# Patient Record
Sex: FEMALE | Race: BLACK OR AFRICAN AMERICAN | NOT HISPANIC OR LATINO | Employment: UNEMPLOYED | URBAN - METROPOLITAN AREA
[De-identification: names, ages, dates, MRNs, and addresses within clinical notes are randomized per-mention and may not be internally consistent; named-entity substitution may affect disease eponyms.]

---

## 2023-03-10 ENCOUNTER — OFFICE VISIT (OUTPATIENT)
Dept: URGENT CARE | Facility: CLINIC | Age: 58
End: 2023-03-10
Payer: OTHER GOVERNMENT

## 2023-03-10 VITALS
OXYGEN SATURATION: 97 % | DIASTOLIC BLOOD PRESSURE: 85 MMHG | HEART RATE: 84 BPM | TEMPERATURE: 98 F | SYSTOLIC BLOOD PRESSURE: 146 MMHG | WEIGHT: 196 LBS | BODY MASS INDEX: 31.5 KG/M2 | RESPIRATION RATE: 16 BRPM | HEIGHT: 66 IN

## 2023-03-10 DIAGNOSIS — L30.9 VULVAR DERMATITIS: ICD-10-CM

## 2023-03-10 DIAGNOSIS — N76.0 ACUTE VAGINITIS: Primary | ICD-10-CM

## 2023-03-10 DIAGNOSIS — R30.0 DYSURIA: ICD-10-CM

## 2023-03-10 LAB
BILIRUB UR QL STRIP: NEGATIVE
GLUCOSE UR QL STRIP: NEGATIVE
KETONES UR QL STRIP: NEGATIVE
LEUKOCYTE ESTERASE UR QL STRIP: NEGATIVE
PH, POC UA: 5 (ref 5–8)
POC BLOOD, URINE: NEGATIVE
POC NITRATES, URINE: NEGATIVE
PROT UR QL STRIP: NEGATIVE
SP GR UR STRIP: 1.01 (ref 1–1.03)
UROBILINOGEN UR STRIP-ACNC: NORMAL (ref 0.1–1.1)

## 2023-03-10 PROCEDURE — 99204 OFFICE O/P NEW MOD 45 MIN: CPT | Mod: S$GLB,,,

## 2023-03-10 PROCEDURE — 81003 URINALYSIS AUTO W/O SCOPE: CPT | Mod: QW,S$GLB,,

## 2023-03-10 PROCEDURE — 99204 PR OFFICE/OUTPT VISIT, NEW, LEVL IV, 45-59 MIN: ICD-10-PCS | Mod: S$GLB,,,

## 2023-03-10 PROCEDURE — 81003 POCT URINALYSIS, DIPSTICK, AUTOMATED, W/O SCOPE: ICD-10-PCS | Mod: QW,S$GLB,,

## 2023-03-10 RX ORDER — NYSTATIN 100000 U/G
OINTMENT TOPICAL 2 TIMES DAILY
Qty: 15 G | Refills: 0 | Status: SHIPPED | OUTPATIENT
Start: 2023-03-10 | End: 2023-08-22

## 2023-03-10 RX ORDER — FLUCONAZOLE 150 MG/1
TABLET ORAL
Qty: 2 TABLET | Refills: 0 | Status: SHIPPED | OUTPATIENT
Start: 2023-03-10 | End: 2023-03-20

## 2023-03-10 RX ORDER — FLUCONAZOLE 150 MG/1
TABLET ORAL
Qty: 2 TABLET | Refills: 0 | Status: SHIPPED | OUTPATIENT
Start: 2023-03-10 | End: 2023-03-10

## 2023-03-10 RX ORDER — NYSTATIN 100000 U/G
OINTMENT TOPICAL 2 TIMES DAILY
Qty: 15 G | Refills: 0 | Status: SHIPPED | OUTPATIENT
Start: 2023-03-10 | End: 2023-03-10

## 2023-03-10 NOTE — PROGRESS NOTES
"Subjective:       Patient ID: Angy Davis is a 57 y.o. female.    Vitals:  height is 5' 6" (1.676 m) and weight is 88.9 kg (196 lb). Her oral temperature is 97.8 °F (36.6 °C). Her blood pressure is 146/85 (abnormal) and her pulse is 84. Her respiration is 16 and oxygen saturation is 97%.     Chief Complaint: Itching    Patient reports having vaginal itching and irritation on and off for a year. Pt reports history of stress incontinence and stated she has to use panty liners and [employer] diapers. Pt is wondering if the vaginal itching is from using a new brand of panty liners. Pt has bladder sling surgery scheduled at end of March. Hx of hysterectomy.     Itching  This is a recurrent problem. Episode onset: 1 year. The problem occurs constantly. The problem has been gradually worsening. Associated symptoms include urinary symptoms. Pertinent negatives include no congestion, coughing, fever, headaches, nausea or vomiting. Associated symptoms comments: Vaginal itching, making sores from itching. Treatments tried: vagisil, chafing cream, monostat. The treatment provided no relief.     Constitution: Negative for fever.   HENT:  Negative for congestion.    Respiratory:  Negative for cough.    Gastrointestinal:  Negative for nausea and vomiting.   Genitourinary:  Positive for dysuria and bladder incontinence (at baseline, Sx schedule for sling placement). Negative for hematuria.        +vaginal itching   Neurological:  Negative for headaches.         Objective:      Physical Exam   Constitutional: She is oriented to person, place, and time. She appears well-developed.  Non-toxic appearance. She does not appear ill. No distress.   HENT:   Head: Normocephalic and atraumatic.   Ears:   Right Ear: External ear normal.   Left Ear: External ear normal.   Nose: Nose normal. No nasal deformity. No epistaxis.   Mouth/Throat: Oropharynx is clear and moist and mucous membranes are normal.   Eyes: Lids are normal.   Neck: Trachea " normal and phonation normal. Neck supple.   Cardiovascular: Normal pulses.   Pulmonary/Chest: Effort normal.   Abdominal: Normal appearance. She exhibits no distension. Soft. There is no abdominal tenderness.   Genitourinary:         Comments: Noted external labia erythema and irritation (R>L). No discharge noted. Urine leakage noted.      Neurological: She is alert and oriented to person, place, and time.   Skin: Skin is warm, dry, intact and not diaphoretic.   Psychiatric: Her speech is normal and behavior is normal.   Nursing note and vitals reviewed.      Results for orders placed or performed in visit on 03/10/23   POCT Urinalysis, Dipstick, Automated, W/O Scope   Result Value Ref Range    POC Blood, Urine Negative Negative    POC Bilirubin, Urine Negative Negative    POC Urobilinogen, Urine normal 0.1 - 1.1    POC Ketones, Urine Negative Negative    POC Protein, Urine Negative Negative    POC Nitrates, Urine Negative Negative    POC Glucose, Urine Negative Negative    pH, UA 5.0 5 - 8    POC Specific Gravity, Urine 1.010 1.003 - 1.029    POC Leukocytes, Urine Negative Negative       Assessment:       1. Acute vaginitis    2. Dysuria    3. Vulvar dermatitis          Plan:         Acute vaginitis  -     NuSwab Vaginitis Plus (VG+)  -     fluconazole (DIFLUCAN) 150 MG Tab; Take one pill by mouth on day one. If symptoms continue, take another pill on day 3.  Dispense: 2 tablet; Refill: 0    Dysuria  -     POCT Urinalysis, Dipstick, Automated, W/O Scope    Vulvar dermatitis  -     nystatin (MYCOSTATIN) ointment; Apply topically 2 (two) times daily. for 7 days  Dispense: 15 g; Refill: 0       UA- negative for hematuria, pyuria.     Will call with Nuswab results. Discussed with patient to avoid panty liners or adult diapers that make irritation worse. Keep area dry as possible and change panty liners often.     If symptoms continue may need to follow up with OBGYN .  Discussed with patient the importance of f/u with  their primary care provider. Urged to go to the ER for any worsening signs or symptoms.

## 2023-03-17 LAB
A VAGINAE DNA VAG QL NAA+PROBE: NORMAL SCORE
BVAB2 DNA VAG QL NAA+PROBE: NORMAL SCORE
C ALBICANS DNA VAG QL NAA+PROBE: NEGATIVE
C GLABRATA DNA VAG QL NAA+PROBE: NEGATIVE
C TRACH DNA VAG QL NAA+PROBE: NEGATIVE
MEGA1 DNA VAG QL NAA+PROBE: NORMAL SCORE
N GONORRHOEA DNA VAG QL NAA+PROBE: NEGATIVE
T VAGINALIS DNA VAG QL NAA+PROBE: NEGATIVE

## 2023-03-28 ENCOUNTER — PATIENT MESSAGE (OUTPATIENT)
Dept: RESEARCH | Facility: HOSPITAL | Age: 58
End: 2023-03-28
Payer: OTHER GOVERNMENT

## 2023-08-22 ENCOUNTER — OFFICE VISIT (OUTPATIENT)
Dept: URGENT CARE | Facility: CLINIC | Age: 58
End: 2023-08-22
Payer: OTHER GOVERNMENT

## 2023-08-22 VITALS
DIASTOLIC BLOOD PRESSURE: 110 MMHG | TEMPERATURE: 97 F | HEIGHT: 66 IN | BODY MASS INDEX: 29.57 KG/M2 | HEART RATE: 71 BPM | WEIGHT: 184 LBS | OXYGEN SATURATION: 97 % | SYSTOLIC BLOOD PRESSURE: 182 MMHG | RESPIRATION RATE: 19 BRPM

## 2023-08-22 DIAGNOSIS — I10 HYPERTENSION, UNSPECIFIED TYPE: ICD-10-CM

## 2023-08-22 DIAGNOSIS — R10.2 PELVIC PAIN: ICD-10-CM

## 2023-08-22 DIAGNOSIS — R07.9 CHEST PAIN, UNSPECIFIED TYPE: Primary | ICD-10-CM

## 2023-08-22 DIAGNOSIS — R53.83 FATIGUE, UNSPECIFIED TYPE: ICD-10-CM

## 2023-08-22 DIAGNOSIS — R53.1 GENERALIZED WEAKNESS: ICD-10-CM

## 2023-08-22 LAB
CTP QC/QA: YES
SARS-COV-2 AG RESP QL IA.RAPID: NEGATIVE

## 2023-08-22 PROCEDURE — 87811 SARS CORONAVIRUS 2 ANTIGEN POCT, MANUAL READ: ICD-10-PCS | Mod: QW,S$GLB,, | Performed by: PHYSICIAN ASSISTANT

## 2023-08-22 PROCEDURE — 99215 OFFICE O/P EST HI 40 MIN: CPT | Mod: S$GLB,,, | Performed by: PHYSICIAN ASSISTANT

## 2023-08-22 PROCEDURE — 73521 X-RAY EXAM HIPS BI 2 VIEWS: CPT | Mod: S$GLB,,, | Performed by: RADIOLOGY

## 2023-08-22 PROCEDURE — 99215 PR OFFICE/OUTPT VISIT, EST, LEVL V, 40-54 MIN: ICD-10-PCS | Mod: S$GLB,,, | Performed by: PHYSICIAN ASSISTANT

## 2023-08-22 PROCEDURE — 73521 XR HIPS BILATERAL 2 VIEW INCL AP PELVIS: ICD-10-PCS | Mod: S$GLB,,, | Performed by: RADIOLOGY

## 2023-08-22 PROCEDURE — 71046 X-RAY EXAM CHEST 2 VIEWS: CPT | Mod: S$GLB,,, | Performed by: RADIOLOGY

## 2023-08-22 PROCEDURE — 93005 ELECTROCARDIOGRAM TRACING: CPT | Mod: S$GLB,,, | Performed by: PHYSICIAN ASSISTANT

## 2023-08-22 PROCEDURE — 71046 XR CHEST PA AND LATERAL: ICD-10-PCS | Mod: S$GLB,,, | Performed by: RADIOLOGY

## 2023-08-22 PROCEDURE — 93010 ELECTROCARDIOGRAM REPORT: CPT | Mod: S$GLB,,, | Performed by: INTERNAL MEDICINE

## 2023-08-22 PROCEDURE — 87811 SARS-COV-2 COVID19 W/OPTIC: CPT | Mod: QW,S$GLB,, | Performed by: PHYSICIAN ASSISTANT

## 2023-08-22 PROCEDURE — 93010 EKG 12-LEAD: ICD-10-PCS | Mod: S$GLB,,, | Performed by: INTERNAL MEDICINE

## 2023-08-22 PROCEDURE — 93005 EKG 12-LEAD: ICD-10-PCS | Mod: S$GLB,,, | Performed by: PHYSICIAN ASSISTANT

## 2023-08-22 NOTE — PATIENT INSTRUCTIONS
Go to the ER for further evaluation of your elevated blood pressure with weakness, fatigue and chest pain.

## 2023-08-22 NOTE — PROGRESS NOTES
"Subjective:      Patient ID: Angy Davis is a 57 y.o. female.    Vitals:  height is 5' 6" (1.676 m) and weight is 83.5 kg (184 lb). Her tympanic temperature is 96.8 °F (36 °C). Her blood pressure is 184/110 (abnormal) and her pulse is 71. Her respiration is 19 and oxygen saturation is 97%.     Chief Complaint: Chest Pain    Pt states that when she gets overheated the chest pain gets bad.     Chest Pain   This is a new problem. Episode onset: 4 days. The problem occurs constantly. The problem has been gradually worsening. The pain is present in the substernal region and lateral region. The pain is at a severity of 8/10. The pain is moderate. The quality of the pain is described as tightness and burning (pressure). The pain radiates to the left jaw and right neck. Associated symptoms include back pain, a cough, headaches, malaise/fatigue and shortness of breath. Associated symptoms comments: Bodyaches, blurred vision . The cough is worsened by activity. The pain is aggravated by deep breathing, movement, walking, exertion and lifting arm. Risk factors include being elderly (COPD, EPHYSEMA).   Her past medical history is significant for anxiety/panic attacks, COPD, hypertension and sleep apnea.   Her family medical history is significant for hypertension. Prior workup: swalllow test yesterday.       Cardiovascular:  Positive for chest pain.   Respiratory:  Positive for cough, COPD and shortness of breath.    Musculoskeletal:  Positive for back pain.   Neurological:  Positive for headaches.      Objective:     Physical Exam   Constitutional: She is oriented to person, place, and time. She appears well-developed. She is cooperative.  Non-toxic appearance. She does not appear ill. No distress.   HENT:   Head: Normocephalic and atraumatic.   Ears:   Right Ear: Hearing, tympanic membrane, external ear and ear canal normal. impacted cerumen  Left Ear: Hearing, external ear and ear canal normal. impacted cerumen  Nose: " Nose normal. No rhinorrhea or congestion.   Mouth/Throat: Mucous membranes are moist. No oropharyngeal exudate or posterior oropharyngeal erythema.   Eyes: Conjunctivae and lids are normal. No scleral icterus.   Neck: Phonation normal. Neck supple.   Cardiovascular: Normal rate, regular rhythm, normal heart sounds and normal pulses.   No murmur heard.Exam reveals no gallop and no friction rub.   Pulmonary/Chest: Effort normal and breath sounds normal. No stridor. No respiratory distress. She has no wheezes. She has no rhonchi. She has no rales.   Abdominal: Normal appearance.   Musculoskeletal: Normal range of motion.         General: No swelling, tenderness, deformity or signs of injury. Normal range of motion.      Comments: No midline TTP or step offs to cervical, thoracic or lumbar spine. No paraspinal muscle TTP. FROM of spine without discomfort or pain. No signs of trauma or injury.   Full range of motion bilateral upper and lower extremities. Strength 5/5.  Intact distal pulses with no sensory deficits.  Capillary refill less than 3 sec.  No signs of trauma or injury. No ecchymosis, edema, erythema, abrasions or lacerations.     Neurological: She is alert and oriented to person, place, and time. Coordination normal.   Skin: Skin is intact, not diaphoretic and not pale.   Psychiatric: Her speech is normal and behavior is normal. Judgment and thought content normal.   Nursing note and vitals reviewed.      Assessment:     1. Chest pain, unspecified type    2. Fatigue, unspecified type    3. Hypertension, unspecified type    4. Generalized weakness    5. Pelvic pain        Plan:       Chest pain, unspecified type  -     IN OFFICE EKG 12-LEAD (to [place])  -     XR CHEST PA AND LATERAL; Future; Expected date: 08/22/2023  -     Refer to Emergency Dept.    Fatigue, unspecified type  -     SARS Coronavirus 2 Antigen, POCT Manual Read  -     Refer to Emergency Dept.    Hypertension, unspecified type  -     Refer to  Emergency Dept.    Generalized weakness  -     Refer to Emergency Dept.    Pelvic pain  -     Cancel: XR PELVIS COMPLETE MIN 3 VIEWS; Future; Expected date: 08/22/2023  -     X-Ray Hips Bilateral 2 View Incl AP Pelvis; Future; Expected date: 08/22/2023      Discussed with Dr Yosi Jeffrey, recommends ER referral due to HTN with CP, fatigue and weakness concerning for hypertensive emergency. Discussed with patient who states that she is not going to the ER. Requesting for imaging to be done. Refusing EMS transfer. Due to the high risk of complications the patient is being sent to the emergency room for further evaluation, treatment and possible hospitalization.    X-Ray Hips Bilateral 2 View Incl AP Pelvis    Result Date: 8/22/2023  EXAMINATION: XR HIPS BILATERAL 2 VIEW INCL AP PELVIS CLINICAL HISTORY: Pelvic and perineal pain TECHNIQUE: AP view of the pelvis and frogleg lateral views of both hips were performed. COMPARISON: None. FINDINGS: There is residual contrast in the partially visualized colon.  Postoperative change lumbosacral spine.  Fusion of the bilateral SI joints.  May be small spurs about the superolateral aspect of the acetabulum.     See above Electronically signed by: Sahil Rasheed MD Date:    08/22/2023 Time:    12:18    XR CHEST PA AND LATERAL    Result Date: 8/22/2023  EXAMINATION: XR CHEST PA AND LATERAL TECHNIQUE: Two views of the chest were obtained, with PA and lateral projections submitted. COMPARISON: Comparison is made to 03/20/2023. FINDINGS: Heart is not significantly enlarged, and the appearance of the cardiomediastinal silhouette has not changed appreciably since the examination referenced above.  Pulmonary vascularity remains normal.  Lung zones continue clear, and are free of significant airspace consolidation or volume loss.  No pleural fluid.  No hilar or mediastinal mass lesion.  No pneumothorax.  Right-sided vagal nerve stimulator again incidentally seen.  Some opaque material  within the colon has the appearance of contrast administered for some type of recent prior imaging study.     No significant intrathoracic abnormality.  Allowing for a poorer inspiratory depth level on the current PA projection, no significant detrimental interval change in the appearance of the chest since 03/20/2023 is appreciated. Electronically signed by: Miguel Hudson MD Date:    08/22/2023 Time:    12:13     Medical Decision Making:   Independently Interpreted Test(s):   I have ordered and independently interpreted X-rays - see summary below.       <> Summary of X-Ray Reading(s): CXR- no infiltrate, effusion, mass, consolidation, lesion. Stimulator in place to right chest    Bilateral hip xray- no acute fracture or dislocation noted. SI joint fusion bilaterally with hardware in place. Hardware also noted to lumbar spine. + arthritic changes noted  I have ordered and independently interpreted EKG Reading(s) - see summary below       <> Summary of EKG Reading(s): EKG- NSR rate of 67 BPM. No acute ischemia, no STEMI. LVH  Clinical Tests:   Lab Tests: Ordered and Reviewed       <> Summary of Lab: COVID negative  Radiological Study: Ordered and Reviewed  Medical Tests: Ordered and Reviewed    Additional MDM:     Heart Failure Score:   COPD = Yes